# Patient Record
Sex: MALE | Race: WHITE | NOT HISPANIC OR LATINO | URBAN - METROPOLITAN AREA
[De-identification: names, ages, dates, MRNs, and addresses within clinical notes are randomized per-mention and may not be internally consistent; named-entity substitution may affect disease eponyms.]

---

## 2019-01-01 ENCOUNTER — INPATIENT (INPATIENT)
Facility: HOSPITAL | Age: 79
LOS: 0 days | End: 2019-06-23
Attending: INTERNAL MEDICINE | Admitting: INTERNAL MEDICINE
Payer: MEDICARE

## 2019-01-01 LAB
ALBUMIN SERPL ELPH-MCNC: 3.1 G/DL — LOW (ref 3.5–5.2)
ALBUMIN SERPL ELPH-MCNC: 3.6 G/DL — SIGNIFICANT CHANGE UP (ref 3.5–5.2)
ALP SERPL-CCNC: 102 U/L — SIGNIFICANT CHANGE UP (ref 30–115)
ALP SERPL-CCNC: 130 U/L — HIGH (ref 30–115)
ALT FLD-CCNC: 125 U/L — HIGH (ref 0–41)
ALT FLD-CCNC: 86 U/L — HIGH (ref 0–41)
ANION GAP SERPL CALC-SCNC: 17 MMOL/L — HIGH (ref 7–14)
ANION GAP SERPL CALC-SCNC: 27 MMOL/L — HIGH (ref 7–14)
ANISOCYTOSIS BLD QL: SLIGHT — SIGNIFICANT CHANGE UP
ANISOCYTOSIS BLD QL: SLIGHT — SIGNIFICANT CHANGE UP
APTT BLD: 25.6 SEC — LOW (ref 27–39.2)
AST SERPL-CCNC: 132 U/L — HIGH (ref 0–41)
AST SERPL-CCNC: 245 U/L — HIGH (ref 0–41)
BASE EXCESS BLDA CALC-SCNC: -1 MMOL/L — SIGNIFICANT CHANGE UP (ref -2–2)
BASE EXCESS BLDA CALC-SCNC: -4.1 MMOL/L — LOW (ref -2–2)
BASE EXCESS BLDV CALC-SCNC: -2.1 MMOL/L — LOW (ref -2–2)
BASOPHILS # BLD AUTO: 0 K/UL — SIGNIFICANT CHANGE UP (ref 0–0.2)
BASOPHILS # BLD AUTO: 0 K/UL — SIGNIFICANT CHANGE UP (ref 0–0.2)
BASOPHILS NFR BLD AUTO: 0 % — SIGNIFICANT CHANGE UP (ref 0–1)
BASOPHILS NFR BLD AUTO: 0 % — SIGNIFICANT CHANGE UP (ref 0–1)
BILIRUB SERPL-MCNC: 0.9 MG/DL — SIGNIFICANT CHANGE UP (ref 0.2–1.2)
BILIRUB SERPL-MCNC: 1.4 MG/DL — HIGH (ref 0.2–1.2)
BUN SERPL-MCNC: 24 MG/DL — HIGH (ref 10–20)
BUN SERPL-MCNC: 30 MG/DL — HIGH (ref 10–20)
CA-I SERPL-SCNC: 1.26 MMOL/L — SIGNIFICANT CHANGE UP (ref 1.12–1.3)
CALCIUM SERPL-MCNC: 8.9 MG/DL — SIGNIFICANT CHANGE UP (ref 8.5–10.1)
CALCIUM SERPL-MCNC: 9.2 MG/DL — SIGNIFICANT CHANGE UP (ref 8.5–10.1)
CHLORIDE SERPL-SCNC: 91 MMOL/L — LOW (ref 98–110)
CHLORIDE SERPL-SCNC: 92 MMOL/L — LOW (ref 98–110)
CK MB CFR SERPL CALC: 177.1 NG/ML — HIGH (ref 0.6–6.3)
CK SERPL-CCNC: 1625 U/L — HIGH (ref 0–225)
CO2 SERPL-SCNC: 20 MMOL/L — SIGNIFICANT CHANGE UP (ref 17–32)
CO2 SERPL-SCNC: 31 MMOL/L — SIGNIFICANT CHANGE UP (ref 17–32)
CREAT SERPL-MCNC: 1.5 MG/DL — SIGNIFICANT CHANGE UP (ref 0.7–1.5)
CREAT SERPL-MCNC: 1.5 MG/DL — SIGNIFICANT CHANGE UP (ref 0.7–1.5)
EOSINOPHIL # BLD AUTO: 0 K/UL — SIGNIFICANT CHANGE UP (ref 0–0.7)
EOSINOPHIL # BLD AUTO: 0.1 K/UL — SIGNIFICANT CHANGE UP (ref 0–0.7)
EOSINOPHIL NFR BLD AUTO: 0 % — SIGNIFICANT CHANGE UP (ref 0–8)
EOSINOPHIL NFR BLD AUTO: 0.8 % — SIGNIFICANT CHANGE UP (ref 0–8)
GAS PNL BLDA: SIGNIFICANT CHANGE UP
GAS PNL BLDA: SIGNIFICANT CHANGE UP
GAS PNL BLDV: 136 MMOL/L — SIGNIFICANT CHANGE UP (ref 136–145)
GAS PNL BLDV: SIGNIFICANT CHANGE UP
GAS PNL BLDV: SIGNIFICANT CHANGE UP
GIANT PLATELETS BLD QL SMEAR: PRESENT — SIGNIFICANT CHANGE UP
GIANT PLATELETS BLD QL SMEAR: PRESENT — SIGNIFICANT CHANGE UP
GLUCOSE BLDC GLUCOMTR-MCNC: 242 MG/DL — HIGH (ref 70–99)
GLUCOSE SERPL-MCNC: 271 MG/DL — HIGH (ref 70–99)
GLUCOSE SERPL-MCNC: 291 MG/DL — HIGH (ref 70–99)
HCO3 BLDA-SCNC: 24 MMOL/L — SIGNIFICANT CHANGE UP (ref 23–27)
HCO3 BLDA-SCNC: 25 MMOL/L — SIGNIFICANT CHANGE UP (ref 23–27)
HCO3 BLDV-SCNC: 28 MMOL/L — SIGNIFICANT CHANGE UP (ref 22–29)
HCT VFR BLD CALC: 39.1 % — LOW (ref 42–52)
HCT VFR BLD CALC: 43.5 % — SIGNIFICANT CHANGE UP (ref 42–52)
HCT VFR BLDA CALC: 43.4 % — SIGNIFICANT CHANGE UP (ref 34–44)
HGB BLD CALC-MCNC: 14.2 G/DL — SIGNIFICANT CHANGE UP (ref 14–18)
HGB BLD-MCNC: 12.3 G/DL — LOW (ref 14–18)
HGB BLD-MCNC: 14.6 G/DL — SIGNIFICANT CHANGE UP (ref 14–18)
HOROWITZ INDEX BLDA+IHG-RTO: 100 — SIGNIFICANT CHANGE UP
HOROWITZ INDEX BLDA+IHG-RTO: 100 — SIGNIFICANT CHANGE UP
INR BLD: 2.1 RATIO — HIGH (ref 0.65–1.3)
LACTATE BLDV-MCNC: 8.2 MMOL/L — HIGH (ref 0.5–1.6)
LACTATE SERPL-SCNC: 11 MMOL/L — CRITICAL HIGH (ref 0.5–2.2)
LYMPHOCYTES # BLD AUTO: 1.76 K/UL — SIGNIFICANT CHANGE UP (ref 1.2–3.4)
LYMPHOCYTES # BLD AUTO: 26.1 % — SIGNIFICANT CHANGE UP (ref 20.5–51.1)
LYMPHOCYTES # BLD AUTO: 3.23 K/UL — SIGNIFICANT CHANGE UP (ref 1.2–3.4)
LYMPHOCYTES # BLD AUTO: 7.8 % — LOW (ref 20.5–51.1)
MAGNESIUM SERPL-MCNC: 2.4 MG/DL — SIGNIFICANT CHANGE UP (ref 1.8–2.4)
MAGNESIUM SERPL-MCNC: 2.9 MG/DL — HIGH (ref 1.8–2.4)
MANUAL SMEAR VERIFICATION: SIGNIFICANT CHANGE UP
MANUAL SMEAR VERIFICATION: SIGNIFICANT CHANGE UP
MCHC RBC-ENTMCNC: 29.1 PG — SIGNIFICANT CHANGE UP (ref 27–31)
MCHC RBC-ENTMCNC: 29.7 PG — SIGNIFICANT CHANGE UP (ref 27–31)
MCHC RBC-ENTMCNC: 31.5 G/DL — LOW (ref 32–37)
MCHC RBC-ENTMCNC: 33.6 G/DL — SIGNIFICANT CHANGE UP (ref 32–37)
MCV RBC AUTO: 88.4 FL — SIGNIFICANT CHANGE UP (ref 80–94)
MCV RBC AUTO: 92.4 FL — SIGNIFICANT CHANGE UP (ref 80–94)
METAMYELOCYTES # FLD: 0.9 % — HIGH (ref 0–0)
MICROCYTES BLD QL: SLIGHT — SIGNIFICANT CHANGE UP
MICROCYTES BLD QL: SLIGHT — SIGNIFICANT CHANGE UP
MONOCYTES # BLD AUTO: 0.11 K/UL — SIGNIFICANT CHANGE UP (ref 0.1–0.6)
MONOCYTES # BLD AUTO: 1.96 K/UL — HIGH (ref 0.1–0.6)
MONOCYTES NFR BLD AUTO: 0.9 % — LOW (ref 1.7–9.3)
MONOCYTES NFR BLD AUTO: 8.7 % — SIGNIFICANT CHANGE UP (ref 1.7–9.3)
MYELOCYTES NFR BLD: 0.9 % — HIGH (ref 0–0)
NEUTROPHILS # BLD AUTO: 18.44 K/UL — HIGH (ref 1.4–6.5)
NEUTROPHILS # BLD AUTO: 8.51 K/UL — HIGH (ref 1.4–6.5)
NEUTROPHILS NFR BLD AUTO: 68.7 % — SIGNIFICANT CHANGE UP (ref 42.2–75.2)
NEUTROPHILS NFR BLD AUTO: 81.8 % — HIGH (ref 42.2–75.2)
PCO2 BLDA: 46 MMHG — HIGH (ref 38–42)
PCO2 BLDA: 56 MMHG — HIGH (ref 38–42)
PCO2 BLDV: 80 MMHG — HIGH (ref 41–51)
PH BLDA: 7.25 — LOW (ref 7.38–7.42)
PH BLDA: 7.34 — LOW (ref 7.38–7.42)
PH BLDV: 7.16 — LOW (ref 7.26–7.43)
PLAT MORPH BLD: NORMAL — SIGNIFICANT CHANGE UP
PLAT MORPH BLD: NORMAL — SIGNIFICANT CHANGE UP
PLATELET # BLD AUTO: 205 K/UL — SIGNIFICANT CHANGE UP (ref 130–400)
PLATELET # BLD AUTO: 229 K/UL — SIGNIFICANT CHANGE UP (ref 130–400)
PO2 BLDA: 224 MMHG — HIGH (ref 78–95)
PO2 BLDA: 68 MMHG — LOW (ref 78–95)
PO2 BLDV: 33 MMHG — SIGNIFICANT CHANGE UP (ref 20–40)
POIKILOCYTOSIS BLD QL AUTO: SIGNIFICANT CHANGE UP
POIKILOCYTOSIS BLD QL AUTO: SIGNIFICANT CHANGE UP
POTASSIUM BLDV-SCNC: 3.5 MMOL/L — SIGNIFICANT CHANGE UP (ref 3.3–5.6)
POTASSIUM SERPL-MCNC: 3.7 MMOL/L — SIGNIFICANT CHANGE UP (ref 3.5–5)
POTASSIUM SERPL-MCNC: 3.9 MMOL/L — SIGNIFICANT CHANGE UP (ref 3.5–5)
POTASSIUM SERPL-SCNC: 3.7 MMOL/L — SIGNIFICANT CHANGE UP (ref 3.5–5)
POTASSIUM SERPL-SCNC: 3.9 MMOL/L — SIGNIFICANT CHANGE UP (ref 3.5–5)
PROT SERPL-MCNC: 5.4 G/DL — LOW (ref 6–8)
PROT SERPL-MCNC: 6 G/DL — SIGNIFICANT CHANGE UP (ref 6–8)
PROTHROM AB SERPL-ACNC: 24 SEC — HIGH (ref 9.95–12.87)
RBC # BLD: 4.23 M/UL — LOW (ref 4.7–6.1)
RBC # BLD: 4.92 M/UL — SIGNIFICANT CHANGE UP (ref 4.7–6.1)
RBC # FLD: 13.8 % — SIGNIFICANT CHANGE UP (ref 11.5–14.5)
RBC # FLD: 13.8 % — SIGNIFICANT CHANGE UP (ref 11.5–14.5)
RBC BLD AUTO: ABNORMAL
RBC BLD AUTO: ABNORMAL
SAO2 % BLDA: 89 % — LOW (ref 94–98)
SAO2 % BLDA: 99 % — HIGH (ref 94–98)
SAO2 % BLDV: 41 % — SIGNIFICANT CHANGE UP
SMUDGE CELLS # BLD: PRESENT — SIGNIFICANT CHANGE UP
SODIUM SERPL-SCNC: 138 MMOL/L — SIGNIFICANT CHANGE UP (ref 135–146)
SODIUM SERPL-SCNC: 140 MMOL/L — SIGNIFICANT CHANGE UP (ref 135–146)
TROPONIN T SERPL-MCNC: 1.2 NG/ML — CRITICAL HIGH
TROPONIN T SERPL-MCNC: <0.01 NG/ML — SIGNIFICANT CHANGE UP
VARIANT LYMPHS # BLD: 1.7 % — SIGNIFICANT CHANGE UP (ref 0–5)
VARIANT LYMPHS # BLD: 1.7 % — SIGNIFICANT CHANGE UP (ref 0–5)
WBC # BLD: 12.39 K/UL — HIGH (ref 4.8–10.8)
WBC # BLD: 22.54 K/UL — HIGH (ref 4.8–10.8)
WBC # FLD AUTO: 12.39 K/UL — HIGH (ref 4.8–10.8)
WBC # FLD AUTO: 22.54 K/UL — HIGH (ref 4.8–10.8)

## 2019-01-01 PROCEDURE — 74177 CT ABD & PELVIS W/CONTRAST: CPT | Mod: 26

## 2019-01-01 PROCEDURE — 70450 CT HEAD/BRAIN W/O DYE: CPT | Mod: 26

## 2019-01-01 PROCEDURE — 92950 HEART/LUNG RESUSCITATION CPR: CPT

## 2019-01-01 PROCEDURE — 71045 X-RAY EXAM CHEST 1 VIEW: CPT | Mod: 26

## 2019-01-01 PROCEDURE — 36556 INSERT NON-TUNNEL CV CATH: CPT

## 2019-01-01 PROCEDURE — 99291 CRITICAL CARE FIRST HOUR: CPT | Mod: 25

## 2019-01-01 PROCEDURE — 71275 CT ANGIOGRAPHY CHEST: CPT | Mod: 26

## 2019-01-01 PROCEDURE — 36620 INSERTION CATHETER ARTERY: CPT

## 2019-01-01 PROCEDURE — 76937 US GUIDE VASCULAR ACCESS: CPT | Mod: 26

## 2019-01-01 PROCEDURE — 93010 ELECTROCARDIOGRAM REPORT: CPT

## 2019-01-01 RX ORDER — SODIUM CHLORIDE 9 MG/ML
1000 INJECTION INTRAMUSCULAR; INTRAVENOUS; SUBCUTANEOUS ONCE
Refills: 0 | Status: COMPLETED | OUTPATIENT
Start: 2019-01-01 | End: 2019-01-01

## 2019-01-01 RX ORDER — FENTANYL CITRATE 50 UG/ML
0.5 INJECTION INTRAVENOUS
Qty: 2500 | Refills: 0 | Status: DISCONTINUED | OUTPATIENT
Start: 2019-01-01 | End: 2019-01-01

## 2019-01-01 RX ORDER — AMLODIPINE BESYLATE 2.5 MG/1
1 TABLET ORAL
Qty: 0 | Refills: 0 | DISCHARGE

## 2019-01-01 RX ORDER — HEPARIN SODIUM 5000 [USP'U]/ML
INJECTION INTRAVENOUS; SUBCUTANEOUS
Qty: 25000 | Refills: 0 | Status: DISCONTINUED | OUTPATIENT
Start: 2019-01-01 | End: 2019-01-01

## 2019-01-01 RX ORDER — AMIODARONE HYDROCHLORIDE 400 MG/1
150 TABLET ORAL ONCE
Refills: 0 | Status: COMPLETED | OUTPATIENT
Start: 2019-01-01 | End: 2019-01-01

## 2019-01-01 RX ORDER — PANTOPRAZOLE SODIUM 20 MG/1
40 TABLET, DELAYED RELEASE ORAL DAILY
Refills: 0 | Status: DISCONTINUED | OUTPATIENT
Start: 2019-01-01 | End: 2019-01-01

## 2019-01-01 RX ORDER — CARVEDILOL PHOSPHATE 80 MG/1
1 CAPSULE, EXTENDED RELEASE ORAL
Qty: 0 | Refills: 0 | DISCHARGE

## 2019-01-01 RX ORDER — VASOPRESSIN 20 [USP'U]/ML
0.02 INJECTION INTRAVENOUS
Qty: 50 | Refills: 0 | Status: DISCONTINUED | OUTPATIENT
Start: 2019-01-01 | End: 2019-01-01

## 2019-01-01 RX ORDER — DOPAMINE HYDROCHLORIDE 40 MG/ML
0.01 INJECTION, SOLUTION, CONCENTRATE INTRAVENOUS
Qty: 400 | Refills: 0 | Status: DISCONTINUED | OUTPATIENT
Start: 2019-01-01 | End: 2019-01-01

## 2019-01-01 RX ORDER — SACUBITRIL AND VALSARTAN 24; 26 MG/1; MG/1
0 TABLET, FILM COATED ORAL
Qty: 0 | Refills: 0 | DISCHARGE

## 2019-01-01 RX ORDER — PHENYLEPHRINE HYDROCHLORIDE 10 MG/ML
0.1 INJECTION INTRAVENOUS
Qty: 160 | Refills: 0 | Status: DISCONTINUED | OUTPATIENT
Start: 2019-01-01 | End: 2019-01-01

## 2019-01-01 RX ORDER — TAMSULOSIN HYDROCHLORIDE 0.4 MG/1
1 CAPSULE ORAL
Qty: 0 | Refills: 0 | DISCHARGE

## 2019-01-01 RX ORDER — DIPHENHYDRAMINE HCL 50 MG
50 CAPSULE ORAL ONCE
Refills: 0 | Status: COMPLETED | OUTPATIENT
Start: 2019-01-01 | End: 2019-01-01

## 2019-01-01 RX ORDER — NOREPINEPHRINE BITARTRATE/D5W 8 MG/250ML
0.05 PLASTIC BAG, INJECTION (ML) INTRAVENOUS
Qty: 16 | Refills: 0 | Status: DISCONTINUED | OUTPATIENT
Start: 2019-01-01 | End: 2019-01-01

## 2019-01-01 RX ORDER — DEXMEDETOMIDINE HYDROCHLORIDE IN 0.9% SODIUM CHLORIDE 4 UG/ML
0.2 INJECTION INTRAVENOUS
Qty: 200 | Refills: 0 | Status: DISCONTINUED | OUTPATIENT
Start: 2019-01-01 | End: 2019-01-01

## 2019-01-01 RX ORDER — TAMSULOSIN HYDROCHLORIDE 0.4 MG/1
0.4 CAPSULE ORAL AT BEDTIME
Refills: 0 | Status: DISCONTINUED | OUTPATIENT
Start: 2019-01-01 | End: 2019-01-01

## 2019-01-01 RX ORDER — FUROSEMIDE 40 MG
40 TABLET ORAL ONCE
Refills: 0 | Status: COMPLETED | OUTPATIENT
Start: 2019-01-01 | End: 2019-01-01

## 2019-01-01 RX ORDER — AMIODARONE HYDROCHLORIDE 400 MG/1
1 TABLET ORAL
Qty: 900 | Refills: 0 | Status: DISCONTINUED | OUTPATIENT
Start: 2019-01-01 | End: 2019-01-01

## 2019-01-01 RX ORDER — FUROSEMIDE 40 MG
1 TABLET ORAL
Qty: 0 | Refills: 0 | DISCHARGE

## 2019-01-01 RX ORDER — DOPAMINE HYDROCHLORIDE 40 MG/ML
10 INJECTION, SOLUTION, CONCENTRATE INTRAVENOUS
Qty: 400 | Refills: 0 | Status: DISCONTINUED | OUTPATIENT
Start: 2019-01-01 | End: 2019-01-01

## 2019-01-01 RX ADMIN — Medication 40 MILLIGRAM(S): at 22:01

## 2019-01-01 RX ADMIN — Medication 50 MILLIGRAM(S): at 18:30

## 2019-01-01 RX ADMIN — PHENYLEPHRINE HYDROCHLORIDE 1.81 MICROGRAM(S)/KG/MIN: 10 INJECTION INTRAVENOUS at 01:12

## 2019-01-01 RX ADMIN — SODIUM CHLORIDE 2000 MILLILITER(S): 9 INJECTION INTRAMUSCULAR; INTRAVENOUS; SUBCUTANEOUS at 16:22

## 2019-01-01 RX ADMIN — FENTANYL CITRATE 3.85 MICROGRAM(S)/KG/HR: 50 INJECTION INTRAVENOUS at 18:02

## 2019-01-01 RX ADMIN — AMIODARONE HYDROCHLORIDE 618 MILLIGRAM(S): 400 TABLET ORAL at 20:37

## 2019-01-01 RX ADMIN — AMIODARONE HYDROCHLORIDE 33.33 MG/MIN: 400 TABLET ORAL at 21:00

## 2019-01-01 RX ADMIN — VASOPRESSIN 1.2 UNIT(S)/MIN: 20 INJECTION INTRAVENOUS at 01:17

## 2019-01-01 RX ADMIN — FENTANYL CITRATE 3.85 MICROGRAM(S)/KG/HR: 50 INJECTION INTRAVENOUS at 18:10

## 2019-01-01 RX ADMIN — DOPAMINE HYDROCHLORIDE 28.88 MICROGRAM(S)/KG/MIN: 40 INJECTION, SOLUTION, CONCENTRATE INTRAVENOUS at 20:05

## 2019-01-01 RX ADMIN — Medication 3.61 MICROGRAM(S)/KG/MIN: at 18:03

## 2019-06-22 NOTE — PATIENT PROFILE ADULT - ABILITY TO HEAR (WITH HEARING AID OR HEARING APPLIANCE IF NORMALLY USED):
doesn't wear hearing aides per family/Mildly to Moderately Impaired: difficulty hearing in some environments or speaker may need to increase volume or speak distinctly

## 2019-06-22 NOTE — ED ADULT NURSE REASSESSMENT NOTE - NS ED NURSE REASSESS COMMENT FT1
Pt brought to CT scan by RN and resp therapist. complete monitoring maintained. Safety maintained during scan. report given to CCU. Pt transported to CCU. safety maintained. vital signs stable. Pt remains of levophed, dopamine, and fentanyl gtt.

## 2019-06-22 NOTE — H&P ADULT - NSHPLABSRESULTS_GEN_ALL_CORE
14.6   22.54 )-----------( 229      ( 22 Jun 2019 19:40 )             43.5     06-22    140  |  92<L>  |  30<H>  ----------------------------<  271<H>  3.7   |  31  |  1.5    Ca    8.9      22 Jun 2019 19:40  Mg     2.4     06-22    TPro  6.0  /  Alb  3.6  /  TBili  1.4<H>  /  DBili  x   /  AST  245<H>  /  ALT  125<H>  /  AlkPhos  130<H>  06-22        ABG - ( 22 Jun 2019 21:58 )  pH, Arterial: 7.25  pH, Blood: x     /  pCO2: 56    /  pO2: 68    / HCO3: 24    / Base Excess: -4.1  /  SaO2: 89        PT/INR - ( 22 Jun 2019 19:40 )   PT: 24.00 sec;   INR: 2.10 ratio      PTT - ( 22 Jun 2019 19:40 )  PTT:25.6 sec    Lactate Trend  06-22 @ 16:35 Lactate:11.0    CARDIAC MARKERS ( 22 Jun 2019 19:40 )  x     / 1.20 ng/mL / 1625 U/L / x     / 177.1 ng/mL  CARDIAC MARKERS ( 22 Jun 2019 16:35 )  x     / <0.01 ng/mL / x     / x     / x        CAPILLARY BLOOD GLUCOSE    POCT Blood Glucose.: 242 mg/dL (22 Jun 2019 19:23)

## 2019-06-22 NOTE — PATIENT PROFILE ADULT - VISION (WITH CORRECTIVE LENSES IF THE PATIENT USUALLY WEARS THEM):
per family/Partially impaired: cannot see medication labels or newsprint, but can see obstacles in path, and the surrounding layout; can count fingers at arm's length

## 2019-06-22 NOTE — CONSULT NOTE ADULT - SUBJECTIVE AND OBJECTIVE BOX
Date of Admission: 6/22/2019    CHIEF COMPLAINT: cardiac arrest    HISTORY OF PRESENT ILLNESS: 78yMale with PMH below presented to the hospital for above CC. patient was home with family and all of a sudden passed out and was pulseless. CPR was started by a family member and patient had CPR for 20 minutes prior to arrival with subsequent shocks for VT. He was in normal health for him this week and did not feel off this morning as per family members. 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 In the past he has refused AICD secondary to his Factor VII deficiency and increased risk of bleeding. His cardiologist in [facility] [place] recently started him on entresto this week.     PAST MEDICAL & SURGICAL HISTORY:  Factor VII deficiency  Benign prostate hyperplasia  Hypertension  Congestive heart failure    HEALTH ISSUES - PROBLEM Dx:        FAMILY HISTORY:    None [ x]  Mother:   Father:   Siblings:     SOCIAL HISTORY:    [x ] Non-smoker  [ ] Smoker  [ ] Alcohol    Allergies    penicillin (Unknown)    Intolerances    	    REVIEW OF SYSTEMS:  unable to obtain full 11 point ROS secondary to patient condition    PHYSICAL EXAM:  T(C): 35 (06-22-19 @ 20:00), Max: 35.4 (06-22-19 @ 17:45)  HR: 114 (06-22-19 @ 23:00) (0 - 115)  BP: 142/79 (06-22-19 @ 19:15) (60/20 - 145/67)  RR: 23 (06-22-19 @ 23:00) (16 - 53)  SpO2: 97% (06-22-19 @ 23:00) (87% - 100%)  Wt(kg): --  I&O's Summary    22 Jun 2019 07:01  -  22 Jun 2019 23:55  --------------------------------------------------------  IN: 1260.3 mL / OUT: 0 mL / NET: 1260.3 mL      Daily Height in cm: 175.26 (22 Jun 2019 19:15)    Daily     General Appearance: elderly female	  Cardiovascular: irregular S1 S2, No JVD, No murmurs, No edema  Respiratory: Lungs clear to auscultation	  Psychiatry: intubated and sedated  Gastrointestinal:  Soft, Non-tender  Skin: No rashes, No ecchymoses, No cyanosis	  Neurologic: Non-focal  Musculoskeletal/ extremities: Normal passive range of motion, No clubbing, cyanosis or edema  Vascular: Peripheral pulses palpable 1+ bilaterally    LABS:	 	                          14.6   22.54 )-----------( 229      ( 22 Jun 2019 19:40 )             43.5     06-22    140  |  92<L>  |  30<H>  ----------------------------<  271<H>  3.7   |  31  |  1.5    Ca    8.9      22 Jun 2019 19:40  Mg     2.4     06-22    TPro  6.0  /  Alb  3.6  /  TBili  1.4<H>  /  DBili  x   /  AST  245<H>  /  ALT  125<H>  /  AlkPhos  130<H>  06-22    CARDIAC MARKERS ( 22 Jun 2019 19:40 )  x     / 1.20 ng/mL / 1625 U/L / x     / 177.1 ng/mL  CARDIAC MARKERS ( 22 Jun 2019 16:35 )  x     / <0.01 ng/mL / x     / x     / x          PT/INR - ( 22 Jun 2019 19:40 )   PT: 24.00 sec;   INR: 2.10 ratio         PTT - ( 22 Jun 2019 19:40 )  PTT:25.6 sec    CARDIAC MARKERS:            TELEMETRY EVENTS: afib in 100s. VTach episode     ECG:  	  RADIOLOGY: patchy infiltrates B/L lung fields.   OTHER: 	    PREVIOUS DIAGNOSTIC TESTING:    [ ] Echocardiogram:  [ ]  Catheterization:  [ ] Stress Test:  	  	    Home Medications:  amLODIPine 2.5 mg oral tablet: 1 tab(s) orally once a day (22 Jun 2019 23:40)  carvedilol 25 mg oral tablet: 1 tab(s) orally 2 times a day (22 Jun 2019 23:40)  Entresto:  (22 Jun 2019 23:40)  furosemide 20 mg oral tablet: 1 tab(s) orally once a day (22 Jun 2019 23:40)  probenecid 500 mg oral tablet: 1 tab(s) orally 2 times a day (22 Jun 2019 23:40)  tamsulosin 0.4 mg oral capsule: 1 cap(s) orally once a day (22 Jun 2019 23:40)    MEDICATIONS  (STANDING):  amiodarone Infusion 1 mG/Min (33.333 mL/Hr) IV Continuous <Continuous>  norepinephrine Infusion 0.05 MICROgram(s)/kG/Min (3.609 mL/Hr) IV Continuous <Continuous>  pantoprazole  Injectable 40 milliGRAM(s) IV Push daily  probenecid 500 milliGRAM(s) Oral two times a day  tamsulosin 0.4 milliGRAM(s) Oral at bedtime    MEDICATIONS  (PRN):

## 2019-06-22 NOTE — CONSULT NOTE ADULT - ATTENDING COMMENTS
PATIENT WAS NOT SEEN BY ME.  HE  PRIOR TO MY EVALUATION.  CASE WAS NOT PRESENTED TO ME PRIOR TO HIS DEATH.  I DO NOT ATTEST TO ABOVE.

## 2019-06-22 NOTE — H&P ADULT - HISTORY OF PRESENT ILLNESS
77 yo M  PMH:  cc: 77 yo M  PMH: HTN, HFrEF, BPH, Factor 7 Deficency  cc: s/p cardiac arrest  History: Obtained from friend and children at bedside  Friend at bedside and friends daughter note that patient presented to their house and was sitting on couch when he suddenly had his head swung backwards. They state he was speaking normally before that and seemed to be at his normal baseline. Patients daughter and son note that he has had increased coughing recently. He had visited his cardiologist who discontinued his losartan and began patient on entresto. Patient was intubated in field. He was found to have pulseless vtach and had multiple shocks given. Patient had CPR performed for greater than 40 minutes in ED. ROSC was achieved and patient central line and teja placed. He was started on Levophed, Dopamine, and Fentanyl. His cardiologist was called and confirmed that patient has a history of HFrEF ( LVEF 25% - 5/2019) and dilated cardiomyopathy (NICM).   **Patients son provided incomplete list of medications. He will bring in full list in AM. Pharmacy is currently closed.

## 2019-06-22 NOTE — ED PROVIDER NOTE - CLINICAL SUMMARY MEDICAL DECISION MAKING FREE TEXT BOX
I personally evaluated the patient. I reviewed the Resident’s or Physician Assistant’s note (as assigned above), and agree with the findings and plan except as documented in my note. patient signed out to me by Dr. truong, patient intubated, patiet endorsed to the icu, patient accepted, icu will follow pan scan

## 2019-06-22 NOTE — PROGRESS NOTE ADULT - SUBJECTIVE AND OBJECTIVE BOX
Pt is an 78 y.o male s/p cardiac arrest in ED - called for difficult balderas placement. Attempted balderas under sterile conditions - 16 fr coude and 12 fr coude, unable to be placed. After attempts were made, pt was a code blue. D/W Dr Perez - its likely at this point that the patient is not making urine - if in AM, balderas still being requested, can obtain a bladder US to assess PVR. Pt will need instrumentation for balderas placement (wire/dilation tray or potentially bedside cysto). CCU resident aware

## 2019-06-22 NOTE — ED PROVIDER NOTE - CRITICAL CARE PROVIDED
additional history taking/direct patient care (not related to procedure)/documentation/interpretation of diagnostic studies/consultation with other physicians/conducted a detailed discussion of DNR status

## 2019-06-22 NOTE — ED PROCEDURE NOTE - CPROC ED INFUS LINE DETAIL1
The guidewire was recovered./Ultrasound guidance was used during placement./The location was identified, and the area was draped and prepped./The catheter was placed using sterile technique./All lumen(s) aspirated and flushed without difficulty.

## 2019-06-22 NOTE — H&P ADULT - NSICDXPASTMEDICALHX_GEN_ALL_CORE_FT
PAST MEDICAL HISTORY:  Benign prostate hyperplasia     Congestive heart failure     Factor VII deficiency     Hypertension

## 2019-06-22 NOTE — CHART NOTE - NSCHARTNOTEFT_GEN_A_CORE
Spoke to Pts cardiologist Dr Paul.   Information obtained from cardiologist-->  Pt has h/o hFrEF ( EF 25 in may 2019). Has dilated cardiomyopathy (NICM).   Pt also has a h/o  factor 7 deficinecy.   Pt is on Heart failure medications and was recently switched to entresto from ARB.

## 2019-06-22 NOTE — H&P ADULT - ASSESSMENT
77 yo M with a PMH of HTN, HFrEF, BPH, Factor 7 Deficiency presented to the hospital s/p cardiac arrest.    Impression :  ACUTE: s/p cardiac arrest  CHRONIC: HTN, HFrEF, BPH, Factor 7 Deficiency    Plan-   CNS: Continue with Precedex     HEENT: Oral Care    Pulmonary: HOB @ 45 degrees   Continue safe mechanical ventilation     Cardiovascular :   CE: negative > 1.20  Keep MAP >65   Cardiology consulted  Heparin Drip    GI : GI prophylaxis  DIET- NPO    Renal:  Follow up lytes, correct as needed    Infectious Disease: Antibiotics   Follow up cultures ; deescalate when cultures back    Hematological : DVT ppx - Heparin ggt    Endocrine : Monitor FSG, begin ISS if FSG > 180     Musculoskeletal : Bedrest    Admit to ICU    Code status - Full 79 yo M with a PMH of HTN, HFrEF, BPH, Factor 7 Deficiency presented to the hospital s/p cardiac arrest.    When in CCU went into cardiac arrest again - maintained full code as per family. ROSC achieved. Amiodarone started - vtach noted as rhythm when code blue began.     Impression :  ACUTE: s/p cardiac arrest  CHRONIC: HTN, HFrEF, BPH, Factor 7 Deficiency    Plan-   CNS: Continue with Precedex     HEENT: Oral Care    Pulmonary: HOB @ 45 degrees   Continue safe mechanical ventilation     Cardiovascular :   CE: negative > 1.20  Keep MAP >65 - On pressors, wean as toelrated  Cardiology consulted  Heparin Drip  Amiodarone Drip     GI : GI prophylaxis  DIET- NPO    Renal:  Follow up lytes, correct as needed    Infectious Disease: Antibiotics   Follow up cultures ; deescalate when cultures back    Hematological : DVT ppx - Heparin ggt    Endocrine : Monitor FSG, begin ISS if FSG > 180     Musculoskeletal : Bedrest    : Urology eval for balderas     Admit to ICU    Code status - Full 79 yo M with a PMH of HTN, HFrEF, BPH, Factor 7 Deficiency presented to the hospital s/p cardiac arrest.    When in CCU went into cardiac arrest again - maintained full code as per family. ROSC achieved. Amiodarone started - vtach noted as rhythm when code blue began.     Impression :  ACUTE: s/p cardiac arrest  CHRONIC: HTN, HFrEF, BPH, Factor 7 Deficiency    Plan:   CNS: Continue with Precedex     HEENT: Oral Care    Pulmonary: HOB @ 45 degrees   Continue safe mechanical ventilation     Cardiovascular :   CE: negative > 1.20  Keep MAP >65 - On pressors, wean as tolerated  Cardiology consulted  Heparin Drip  Amiodarone Drip     GI : GI prophylaxis  DIET- NPO    Renal:  Follow up lytes, correct as needed    Infectious Disease: Antibiotics   Follow up cultures ; deescalate when cultures back    Hematological : DVT ppx - Heparin ggt    Endocrine : Monitor FSG, begin ISS if FSG > 180     Musculoskeletal : Bedrest    : Urology eval for balderas     Admit to ICU    Code status - Full

## 2019-06-22 NOTE — ED ADULT NURSE NOTE - OBJECTIVE STATEMENT
Pt brought in by EMS in cardiac arrest. CPR in progress. Pt had witnessed arrest at home. Pt intubated in field. 6mg of epi given by EMS. CPR continued on arrival to ED. see code sheet for details.

## 2019-06-22 NOTE — ED PROVIDER NOTE - ATTENDING CONTRIBUTION TO CARE
I personally evaluated the patient. I reviewed the Resident’s or Physician Assistant’s note (as assigned above), and agree with the findings and plan except as documented in my note.     78 male here s/p cardiac arrest brought to ED by EMS with ALS / BLS care prior to arrival. Pt with witnessed arrest, initial rhythm VF with multiple defibrillations provided. ACLS instituted with epi x 6 and sodium bicarbonate, magnesium given per [facility] orders.  No clear etiology of arrest per EMS, no HPI with easily reversible causes.  Family in ED to assist with HPI, son states recent Rx change of ACEI to combination Rx.     ROS unable to obtain.    PE: male in cardiac arrest. HEENT: ETT in place. no blood at mouth. CV: pulseless. CHEST: assisted ventilations, ronchi to auscultation. ABD: no distention. : normal. NEURO: GCS 3. SKIN: no pallor     Impression: cardiac arrest    Plan: resuscitation, continue critical care management, IV labs imaging supportive care and admission

## 2019-06-22 NOTE — ED PROVIDER NOTE - PROGRESS NOTE DETAILS
patient had ACLS instituted for > 45 minutes in ED with ETCO2 holding in 30s-40s.  Required transcutaneous pacing, parenteral vasopressors, and additional Rx.  Presumed diagnosis is ACS but patient is too ill to move to diagnostics to eval for other catastrophic events causing his cardiac arrest. Initial VBG had lactate 8 / potassium 3.5 with pH 7.16, improved with CPR and resuscitation in ED.  Presently patient is on multiple pressors with arterial line in place, EKG reveals no STEMI and VBG parameters are improving. Plan is for imaging diagnostics and admission to ICU setting. Plan d/w family in detail numerous times during his resuscitation.

## 2019-06-22 NOTE — CONSULT NOTE ADULT - ASSESSMENT
Afib  NSTEMI  cardiomyopathy  VTACH  Cardiogenic Shock  prolonged cardiac arrest x3    - continue amiodarone  - continue levophed  - would load with aspirin 325 x1 and then 81 mg q24h   - would start heparin gtt in light of NSTEMI. there is not good data for factor VII deficiency and its inherent risks for hypocoagulability  - hematology consultation  - overall very poor prognosis.

## 2019-06-22 NOTE — ED PROVIDER NOTE - PHYSICAL EXAMINATION
Vital Signs: I have reviewed the initial vital signs.  Constitutional: unresponsive  HEENT: pupils fixed dilated, head atraumatic et tube in place  CV: pulseless  Lungs: intubated, BL breath sounds/chest rise  ABD: no ecchymosis,  no pulsatile mass, no hernias.  MSK: no gross deformity  INTEG: Skin cool dry, no rash.  NEURO: unresponsive  PSYCH: unresponsive

## 2019-06-22 NOTE — ED PROCEDURE NOTE - ATTENDING CONTRIBUTION TO CARE
I was present for and supervised the key critical aspects of the procedures performed during the care of the patient.     I personally evaluated the patient. I reviewed the Resident’s or Physician Assistant’s note (as assigned above), and agree with the findings and plan except as documented in my note.
I was present for and supervised the key critical aspects of the procedures performed during the care of the patient.     I personally evaluated the patient. I reviewed the Resident’s or Physician Assistant’s note (as assigned above), and agree with the findings and plan except as documented in my note.

## 2019-06-22 NOTE — ED ADULT NURSE NOTE - CHIEF COMPLAINT QUOTE
Pt BIBA from home. Pt was a pre note for cardiac arrest. Pt arrived in ED, CPR in progress. Pt intubated in field with size 7.5 ett.

## 2019-06-22 NOTE — ED ADULT NURSE NOTE - PMH
Benign prostate hyperplasia    Congestive heart failure    Hypertension Benign prostate hyperplasia    Congestive heart failure    Factor VII deficiency    Hypertension

## 2019-06-22 NOTE — H&P ADULT - MENTAL STATUS
Patient intubated - non responsive. Pinpoint pupils. Does respond to painful stimuli. Does not track through room.

## 2019-06-22 NOTE — ED PROCEDURE NOTE - CPROC ED ARTER LINE DETAIL1
Positive blood return was obtained via the catheter./Line was sutured in place./Using sterile technique, the correct location was identified, and a needle was inserted into the artery (specify in FT)./Connected to a pressurized flush line./Hemostasis was achieved with direct pressure, and a dry sterile dressing applied.

## 2019-06-22 NOTE — ED PROVIDER NOTE - OBJECTIVE STATEMENT
78yM bibems in cardiac arrest with resport that pt suddenly told witnesses he didn't feel well then became unresponsive. intubated in field after found in pulseless vtach, multiple shocks, meds given, still in arrest on arrival, multiple rounds cpr and acls meds given (see code note) with rosc. teja and central line placed, tube confirmed appropriate placement.

## 2019-06-23 NOTE — DISCHARGE NOTE FOR THE EXPIRED PATIENT - HOSPITAL COURSE
Friend at bedside and friends daughter note that patient presented to their house and was sitting on couch when he suddenly had his head swung backwards. They state he was speaking normally before that and seemed to be at his normal baseline. Patients daughter and son note that he has had increased coughing recently. He had visited his cardiologist who discontinued his losartan and began patient on entresto. Patient was intubated in field. He was found to have pulseless vtach and had multiple shocks given. Patient had CPR performed for greater than 40 minutes in ED. ROSC was achieved and patient central line and teja placed. He was started on Levophed, Dopamine, and Fentanyl. His cardiologist was called and confirmed that patient has a history of HFrEF ( LVEF 25% - 2019) and dilated cardiomyopathy (NICM). The pt was transferred to the CCU and upon arrival he coded again, was found to be in pulseless VTach. The pt was coded until ROSC was received. CT chest/AP demonstrated multiple rib fxs and sternal fx following 2nd round of CPR. Discussed the findings with the family at bedside. The pt was weaned off sedation, he did not have a corneal reflex, he did not withdraw to pain. Discussed GOC with family who made him DNR. The pt was maxed out on 2 pressors to maintain his BP however, despite these meds, the pt  while ventilated from cardiopulmonary arrest secondary to heart failure.

## 2019-06-27 DIAGNOSIS — S22.20XA UNSPECIFIED FRACTURE OF STERNUM, INITIAL ENCOUNTER FOR CLOSED FRACTURE: ICD-10-CM

## 2019-06-27 DIAGNOSIS — Z88.0 ALLERGY STATUS TO PENICILLIN: ICD-10-CM

## 2019-06-27 DIAGNOSIS — R57.0 CARDIOGENIC SHOCK: ICD-10-CM

## 2019-06-27 DIAGNOSIS — S22.49XA MULTIPLE FRACTURES OF RIBS, UNSPECIFIED SIDE, INITIAL ENCOUNTER FOR CLOSED FRACTURE: ICD-10-CM

## 2019-06-27 DIAGNOSIS — I50.22 CHRONIC SYSTOLIC (CONGESTIVE) HEART FAILURE: ICD-10-CM

## 2019-06-27 DIAGNOSIS — D68.2 HEREDITARY DEFICIENCY OF OTHER CLOTTING FACTORS: ICD-10-CM

## 2019-06-27 DIAGNOSIS — I48.91 UNSPECIFIED ATRIAL FIBRILLATION: ICD-10-CM
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
## 2019-06-27 DIAGNOSIS — I46.9 CARDIAC ARREST, CAUSE UNSPECIFIED: ICD-10-CM

## 2019-06-27 DIAGNOSIS — I49.01 VENTRICULAR FIBRILLATION: ICD-10-CM

## 2019-06-27 DIAGNOSIS — I47.2 VENTRICULAR TACHYCARDIA: ICD-10-CM

## 2019-06-27 DIAGNOSIS — N40.0 BENIGN PROSTATIC HYPERPLASIA WITHOUT LOWER URINARY TRACT SYMPTOMS: ICD-10-CM

## 2019-06-27 DIAGNOSIS — I42.0 DILATED CARDIOMYOPATHY: ICD-10-CM
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
## 2019-06-27 DIAGNOSIS — I11.0 HYPERTENSIVE HEART DISEASE WITH HEART FAILURE: ICD-10-CM

## 2019-06-28 LAB
CULTURE RESULTS: SIGNIFICANT CHANGE UP
SPECIMEN SOURCE: SIGNIFICANT CHANGE UP
